# Patient Record
Sex: MALE | Race: OTHER | HISPANIC OR LATINO
[De-identification: names, ages, dates, MRNs, and addresses within clinical notes are randomized per-mention and may not be internally consistent; named-entity substitution may affect disease eponyms.]

---

## 2024-05-25 ENCOUNTER — RESULT REVIEW (OUTPATIENT)
Age: 29
End: 2024-05-25

## 2024-05-25 ENCOUNTER — INPATIENT (INPATIENT)
Facility: HOSPITAL | Age: 29
LOS: 0 days | Discharge: HOME | End: 2024-05-26
Attending: INTERNAL MEDICINE | Admitting: INTERNAL MEDICINE
Payer: MEDICAID

## 2024-05-25 VITALS
RESPIRATION RATE: 16 BRPM | SYSTOLIC BLOOD PRESSURE: 124 MMHG | WEIGHT: 156.09 LBS | TEMPERATURE: 98 F | DIASTOLIC BLOOD PRESSURE: 85 MMHG | HEART RATE: 68 BPM | OXYGEN SATURATION: 100 %

## 2024-05-25 DIAGNOSIS — K81.0 ACUTE CHOLECYSTITIS: ICD-10-CM

## 2024-05-25 LAB
ALBUMIN SERPL ELPH-MCNC: 4.2 G/DL — SIGNIFICANT CHANGE UP (ref 3.3–5)
ALP SERPL-CCNC: 110 U/L — SIGNIFICANT CHANGE UP (ref 40–120)
ALT FLD-CCNC: 26 U/L — SIGNIFICANT CHANGE UP (ref 4–41)
ANION GAP SERPL CALC-SCNC: 12 MMOL/L — SIGNIFICANT CHANGE UP (ref 7–14)
APTT BLD: 33.7 SEC — SIGNIFICANT CHANGE UP (ref 24.5–35.6)
AST SERPL-CCNC: 19 U/L — SIGNIFICANT CHANGE UP (ref 4–40)
BASOPHILS # BLD AUTO: 0.03 K/UL — SIGNIFICANT CHANGE UP (ref 0–0.2)
BASOPHILS NFR BLD AUTO: 0.4 % — SIGNIFICANT CHANGE UP (ref 0–2)
BILIRUB SERPL-MCNC: 0.9 MG/DL — SIGNIFICANT CHANGE UP (ref 0.2–1.2)
BLD GP AB SCN SERPL QL: NEGATIVE — SIGNIFICANT CHANGE UP
BUN SERPL-MCNC: 14 MG/DL — SIGNIFICANT CHANGE UP (ref 7–23)
CALCIUM SERPL-MCNC: 9.2 MG/DL — SIGNIFICANT CHANGE UP (ref 8.4–10.5)
CHLORIDE SERPL-SCNC: 103 MMOL/L — SIGNIFICANT CHANGE UP (ref 98–107)
CO2 SERPL-SCNC: 23 MMOL/L — SIGNIFICANT CHANGE UP (ref 22–31)
CREAT SERPL-MCNC: 0.87 MG/DL — SIGNIFICANT CHANGE UP (ref 0.5–1.3)
EGFR: 119 ML/MIN/1.73M2 — SIGNIFICANT CHANGE UP
EOSINOPHIL # BLD AUTO: 0.19 K/UL — SIGNIFICANT CHANGE UP (ref 0–0.5)
EOSINOPHIL NFR BLD AUTO: 2.6 % — SIGNIFICANT CHANGE UP (ref 0–6)
GLUCOSE SERPL-MCNC: 85 MG/DL — SIGNIFICANT CHANGE UP (ref 70–99)
HCT VFR BLD CALC: 43.2 % — SIGNIFICANT CHANGE UP (ref 39–50)
HGB BLD-MCNC: 15.1 G/DL — SIGNIFICANT CHANGE UP (ref 13–17)
IANC: 4.35 K/UL — SIGNIFICANT CHANGE UP (ref 1.8–7.4)
IMM GRANULOCYTES NFR BLD AUTO: 0.1 % — SIGNIFICANT CHANGE UP (ref 0–0.9)
INR BLD: 1.05 RATIO — SIGNIFICANT CHANGE UP (ref 0.85–1.18)
LIDOCAIN IGE QN: 40 U/L — SIGNIFICANT CHANGE UP (ref 7–60)
LYMPHOCYTES # BLD AUTO: 1.96 K/UL — SIGNIFICANT CHANGE UP (ref 1–3.3)
LYMPHOCYTES # BLD AUTO: 27 % — SIGNIFICANT CHANGE UP (ref 13–44)
MCHC RBC-ENTMCNC: 29.1 PG — SIGNIFICANT CHANGE UP (ref 27–34)
MCHC RBC-ENTMCNC: 35 GM/DL — SIGNIFICANT CHANGE UP (ref 32–36)
MCV RBC AUTO: 83.2 FL — SIGNIFICANT CHANGE UP (ref 80–100)
MONOCYTES # BLD AUTO: 0.71 K/UL — SIGNIFICANT CHANGE UP (ref 0–0.9)
MONOCYTES NFR BLD AUTO: 9.8 % — SIGNIFICANT CHANGE UP (ref 2–14)
NEUTROPHILS # BLD AUTO: 4.35 K/UL — SIGNIFICANT CHANGE UP (ref 1.8–7.4)
NEUTROPHILS NFR BLD AUTO: 60.1 % — SIGNIFICANT CHANGE UP (ref 43–77)
NRBC # BLD: 0 /100 WBCS — SIGNIFICANT CHANGE UP (ref 0–0)
NRBC # FLD: 0 K/UL — SIGNIFICANT CHANGE UP (ref 0–0)
PLATELET # BLD AUTO: 239 K/UL — SIGNIFICANT CHANGE UP (ref 150–400)
POTASSIUM SERPL-MCNC: 4.5 MMOL/L — SIGNIFICANT CHANGE UP (ref 3.5–5.3)
POTASSIUM SERPL-SCNC: 4.5 MMOL/L — SIGNIFICANT CHANGE UP (ref 3.5–5.3)
PROT SERPL-MCNC: 7.4 G/DL — SIGNIFICANT CHANGE UP (ref 6–8.3)
PROTHROM AB SERPL-ACNC: 11.7 SEC — SIGNIFICANT CHANGE UP (ref 9.5–13)
RBC # BLD: 5.19 M/UL — SIGNIFICANT CHANGE UP (ref 4.2–5.8)
RBC # FLD: 12.8 % — SIGNIFICANT CHANGE UP (ref 10.3–14.5)
RH IG SCN BLD-IMP: POSITIVE — SIGNIFICANT CHANGE UP
SODIUM SERPL-SCNC: 138 MMOL/L — SIGNIFICANT CHANGE UP (ref 135–145)
WBC # BLD: 7.25 K/UL — SIGNIFICANT CHANGE UP (ref 3.8–10.5)
WBC # FLD AUTO: 7.25 K/UL — SIGNIFICANT CHANGE UP (ref 3.8–10.5)

## 2024-05-25 PROCEDURE — 47562 LAPAROSCOPIC CHOLECYSTECTOMY: CPT | Mod: 80

## 2024-05-25 PROCEDURE — 76705 ECHO EXAM OF ABDOMEN: CPT | Mod: 26

## 2024-05-25 PROCEDURE — 88304 TISSUE EXAM BY PATHOLOGIST: CPT | Mod: 26

## 2024-05-25 PROCEDURE — 99285 EMERGENCY DEPT VISIT HI MDM: CPT

## 2024-05-25 PROCEDURE — 99222 1ST HOSP IP/OBS MODERATE 55: CPT | Mod: GC,57

## 2024-05-25 DEVICE — LIGATING CLIPS WECK HEMOLOK POLYMER LARGE (PURPLE) 6: Type: IMPLANTABLE DEVICE | Status: FUNCTIONAL

## 2024-05-25 RX ORDER — PIPERACILLIN AND TAZOBACTAM 4; .5 G/20ML; G/20ML
3.38 INJECTION, POWDER, LYOPHILIZED, FOR SOLUTION INTRAVENOUS ONCE
Refills: 0 | Status: COMPLETED | OUTPATIENT
Start: 2024-05-25 | End: 2024-05-25

## 2024-05-25 RX ORDER — OXYCODONE HYDROCHLORIDE 5 MG/1
5 TABLET ORAL EVERY 4 HOURS
Refills: 0 | Status: DISCONTINUED | OUTPATIENT
Start: 2024-05-25 | End: 2024-05-26

## 2024-05-25 RX ORDER — LIDOCAINE 4 G/100G
10 CREAM TOPICAL ONCE
Refills: 0 | Status: COMPLETED | OUTPATIENT
Start: 2024-05-25 | End: 2024-05-25

## 2024-05-25 RX ORDER — ENOXAPARIN SODIUM 100 MG/ML
40 INJECTION SUBCUTANEOUS EVERY 24 HOURS
Refills: 0 | Status: DISCONTINUED | OUTPATIENT
Start: 2024-05-25 | End: 2024-05-26

## 2024-05-25 RX ORDER — PIPERACILLIN AND TAZOBACTAM 4; .5 G/20ML; G/20ML
3.38 INJECTION, POWDER, LYOPHILIZED, FOR SOLUTION INTRAVENOUS EVERY 8 HOURS
Refills: 0 | Status: DISCONTINUED | OUTPATIENT
Start: 2024-05-26 | End: 2024-05-26

## 2024-05-25 RX ORDER — SODIUM CHLORIDE 9 MG/ML
1000 INJECTION, SOLUTION INTRAVENOUS
Refills: 0 | Status: DISCONTINUED | OUTPATIENT
Start: 2024-05-25 | End: 2024-05-26

## 2024-05-25 RX ORDER — ACETAMINOPHEN 500 MG
975 TABLET ORAL EVERY 6 HOURS
Refills: 0 | Status: DISCONTINUED | OUTPATIENT
Start: 2024-05-25 | End: 2024-05-26

## 2024-05-25 RX ORDER — SODIUM CHLORIDE 9 MG/ML
1000 INJECTION, SOLUTION INTRAVENOUS ONCE
Refills: 0 | Status: COMPLETED | OUTPATIENT
Start: 2024-05-25 | End: 2024-05-25

## 2024-05-25 RX ORDER — PIPERACILLIN AND TAZOBACTAM 4; .5 G/20ML; G/20ML
3.38 INJECTION, POWDER, LYOPHILIZED, FOR SOLUTION INTRAVENOUS ONCE
Refills: 0 | Status: COMPLETED | OUTPATIENT
Start: 2024-05-26 | End: 2024-05-26

## 2024-05-25 RX ORDER — FAMOTIDINE 10 MG/ML
20 INJECTION INTRAVENOUS ONCE
Refills: 0 | Status: COMPLETED | OUTPATIENT
Start: 2024-05-25 | End: 2024-05-25

## 2024-05-25 RX ADMIN — SODIUM CHLORIDE 1000 MILLILITER(S): 9 INJECTION, SOLUTION INTRAVENOUS at 07:59

## 2024-05-25 RX ADMIN — PIPERACILLIN AND TAZOBACTAM 25 GRAM(S): 4; .5 INJECTION, POWDER, LYOPHILIZED, FOR SOLUTION INTRAVENOUS at 18:49

## 2024-05-25 RX ADMIN — LIDOCAINE 10 MILLILITER(S): 4 CREAM TOPICAL at 03:38

## 2024-05-25 RX ADMIN — PIPERACILLIN AND TAZOBACTAM 25 GRAM(S): 4; .5 INJECTION, POWDER, LYOPHILIZED, FOR SOLUTION INTRAVENOUS at 12:09

## 2024-05-25 RX ADMIN — PIPERACILLIN AND TAZOBACTAM 200 GRAM(S): 4; .5 INJECTION, POWDER, LYOPHILIZED, FOR SOLUTION INTRAVENOUS at 07:19

## 2024-05-25 RX ADMIN — Medication 30 MILLILITER(S): at 03:37

## 2024-05-25 RX ADMIN — ENOXAPARIN SODIUM 40 MILLIGRAM(S): 100 INJECTION SUBCUTANEOUS at 12:09

## 2024-05-25 RX ADMIN — FAMOTIDINE 20 MILLIGRAM(S): 10 INJECTION INTRAVENOUS at 03:38

## 2024-05-25 NOTE — PATIENT PROFILE ADULT - IS THERE A SUSPICION OF ABUSE/NEGLIGENCE?
Prior to initial hospitalization pt lived with his spouse and children in a ph, 12 fadi +HR, additional 5 steps to bedroom +hand rail  +tub in bathroom +hand rail. Pt reports that he was independent in all adl's and functional mobility without DME.  Prior to transfer here from OSH pt was ambulating with a rolling walker no

## 2024-05-25 NOTE — H&P ADULT - ASSESSMENT
28M with no PMHx/PSHx presents with acute onset epigastric pain radiating to back after eating dinner. Multiple episodes of NBNB vomiting. RUQ TTP. WBC 7. LFTs, tbili WNL. RUQ US showing cholelithiasis. Pt is Druze and refuses blood transfusion.    PLAN  - Acute cholecystitis based on cholelithiasis on imaging and RUQ TTP  - OR add-on for lap desiree #9 - pt aware surgery not go today  - Consent in chart - Jordanian  - Abx: zosyn  - Pain: ATC IV tylenol  - Diet: NPO, IVF  - DVT PPx: lovenox    Discussed with Dr. Beltrán    Surgery Team B  [id number]

## 2024-05-25 NOTE — ED PROVIDER NOTE - PHYSICAL EXAMINATION
VITALS: reviewed  GEN: NAD, A & O x 4  HEAD/EYES: NCAT, EOMI, anicteric sclerae,   ENT: mucus membranes moist, oropharynx WNL, trachea midline,  RESP: lungs CTA with equal breath sounds bilaterally, chest wall nontender and atraumatic  CV: heart with reg rhythm S1, S2, distal pulses intact and symmetric bilaterally  ABDOMEN: soft, nondistended, epigastric ttp, no palpable masses  : no CVAT  MSK: extremities atraumatic and nontender, no edema, no asymmetry.  SKIN: warm, dry, no rash, no bruising, no cyanosis. color appropriate for ethnicity  NEURO: alert, mentating appropriately, no facial asymmetry.   PSYCH: Affect appropriate

## 2024-05-25 NOTE — ED ADULT NURSE NOTE - OBJECTIVE STATEMENT
28 year old AO4 ambulatory male c/o epigastric pain after eating dinner. Denies PMHx. Noted to be in no acute distress. Right 20g IV placed, labs drawn, medicated as per eMAR. Pending imaging.

## 2024-05-25 NOTE — ED ADULT TRIAGE NOTE - HEART RATE (BEATS/MIN)
Patient called stating the pharmacy did not recieve our request for the methylphenidate (RITALIN) 20 MG tablet.    Please call to discuss.    68

## 2024-05-25 NOTE — H&P ADULT - NSHPLABSRESULTS_GEN_ALL_CORE
Vital Signs Last 24 Hrs  T(C): 36.6 (25 May 2024 00:16), Max: 36.6 (25 May 2024 00:16)  T(F): 97.9 (25 May 2024 00:16), Max: 97.9 (25 May 2024 00:16)  HR: 68 (25 May 2024 00:16) (68 - 68)  BP: 124/85 (25 May 2024 00:16) (124/85 - 124/85)  BP(mean): --  RR: 16 (25 May 2024 00:16) (16 - 16)  SpO2: 100% (25 May 2024 00:16) (100% - 100%)    LABS:                        15.1   7.25  )-----------( 239      ( 25 May 2024 04:16 )             43.2     05-25    138  |  103  |  14  ----------------------------<  85  4.5   |  23  |  0.87    Ca    9.2      25 May 2024 04:16    TPro  7.4  /  Alb  4.2  /  TBili  0.9  /  DBili  x   /  AST  19  /  ALT  26  /  AlkPhos  110  05-25    PT/INR - ( 25 May 2024 06:05 )   PT: 11.7 sec;   INR: 1.05 ratio         PTT - ( 25 May 2024 06:05 )  PTT:33.7 sec  Urinalysis Basic - ( 25 May 2024 04:16 )    Color: x / Appearance: x / SG: x / pH: x  Gluc: 85 mg/dL / Ketone: x  / Bili: x / Urobili: x   Blood: x / Protein: x / Nitrite: x   Leuk Esterase: x / RBC: x / WBC x   Sq Epi: x / Non Sq Epi: x / Bacteria: x    LIVER FUNCTIONS - ( 25 May 2024 04:16 )  Alb: 4.2 g/dL / Pro: 7.4 g/dL / ALK PHOS: 110 U/L / ALT: 26 U/L / AST: 19 U/L / GGT: x           RADIOLOGY & ADDITIONAL STUDIES:  < from: US Abdomen Upper Quadrant Right (05.25.24 @ 05:36) >    ACC: [id number] EXAM:  US ABDOMEN RT UPR QUADRANT   ORDERED BY: MISBAH FRANCO     PROCEDURE DATE:  05/25/2024          INTERPRETATION:  CLINICAL INFORMATION: Right upper quadrant abdominal   pain.    COMPARISON: None available.    TECHNIQUE: Sonographyof the right upper quadrant.    FINDINGS:    Liver: Within normal limits.  Bile ducts: Normal caliber. Common bile duct measures 3 mm.  Gallbladder: Multiple gallstones extending into the neck identified,   largest measuring up to 1.2 cm. No significant gallbladder wall   thickening or pericholecystic edema. Negative reported sonographic Nguyen   sign.  Pancreas: Visualized portions are within normal limits.  Right kidney: 8.9 cm. No hydronephrosis.  Ascites: None.  Aorta/IVC: Suboptimally assessed secondary to overlying bowel gas.    IMPRESSION:    Cholelithiasis. No significant gallbladder wall thickening or   pericholecystic edema.  Consider right upper quadrant ultrasound if there is concern for acute   gallbladder disease.    < end of copied text >

## 2024-05-25 NOTE — H&P ADULT - HISTORY OF PRESENT ILLNESS
28M with no PMHx/PSHx presents with acute onset epigastric pain radiating to back after eating dinner. Multiple episodes of NBNB vomiting. Pt denies fever, chills, CP, SOB, distension, constipation, dysuria.

## 2024-05-25 NOTE — H&P ADULT - NSHPPHYSICALEXAM_GEN_ALL_CORE
Physical Exam:  General: NAD, resting comfortably  HEENT: NC/AT, EOMI, normal hearing, no oral lesions, no LAD, neck supple  Pulmonary: normal resp effort, patent airway  Abdominal: soft, nondistended, epigastric/RUQ TTP  Extremities: WWP, normal strength  Neuro: A/O x 3, CNs II-XII grossly intact

## 2024-05-25 NOTE — ED ADULT NURSE NOTE - CHIEF COMPLAINT QUOTE
pt c/o epigastric pain after eating dinner tonight. states area tender to touch. no past medical hx. pt denies chest pain, vomiting. pt well appearing. awaiting ekg.

## 2024-05-25 NOTE — H&P ADULT - ATTENDING COMMENTS
Patient with acute cholecystitis based on clinical exam, labs, and imaging  plan  or for lap desiree  iv abx    I have personally interviewed and examined this patient, reviewed pertinent labs and imaging, and discussed the case with colleagues, residents, and physician assistants on B Team rounds.    The active care issues are:  1. acute cholecystitis    The Acute Care Surgery (B Team) Attending Group Practice:  Dr. Aidan Hutton, Dr. Forrest Barajas, Dr. Charlie Beltrán, Dr. Rafat Serrano,     urgent issues - spectra [id number]  nonurgent issues - [phone]  patient appointments or afterhours - [phone]

## 2024-05-25 NOTE — ED PROVIDER NOTE - CLINICAL SUMMARY MEDICAL DECISION MAKING FREE TEXT BOX
27 yo M, no reported pmhx, presenting with complaints of post-prandial epigastric abdominal pain, radiating to his back. Pain started after eating dinner tonight at  7 pm. Associated nausea, no vomiting. Pain is described as strong, burning in sensation, radiating to back and up his throat. No exertional symptoms. No recent travel. No leg pain/swelling. Non-positional. Denies history of abdominal surgeries. Pt notes that pain is worse when he touches the area. On exam, + epigastric ttp. Plan for labs, including lipase, US and reassess. ddx: pancreatitis, cholecystitis/biliary colic, gastritis, vs other intra-abdominal pathology. Reassess s/p meds

## 2024-05-26 ENCOUNTER — TRANSCRIPTION ENCOUNTER (OUTPATIENT)
Age: 29
End: 2024-05-26

## 2024-05-26 VITALS
DIASTOLIC BLOOD PRESSURE: 67 MMHG | OXYGEN SATURATION: 97 % | TEMPERATURE: 98 F | RESPIRATION RATE: 18 BRPM | SYSTOLIC BLOOD PRESSURE: 107 MMHG | HEART RATE: 70 BPM

## 2024-05-26 LAB
ALBUMIN SERPL ELPH-MCNC: 3.9 G/DL — SIGNIFICANT CHANGE UP (ref 3.3–5)
ALBUMIN SERPL ELPH-MCNC: 4.2 G/DL — SIGNIFICANT CHANGE UP (ref 3.3–5)
ALP SERPL-CCNC: 102 U/L — SIGNIFICANT CHANGE UP (ref 40–120)
ALP SERPL-CCNC: 97 U/L — SIGNIFICANT CHANGE UP (ref 40–120)
ALT FLD-CCNC: 30 U/L — SIGNIFICANT CHANGE UP (ref 4–41)
ALT FLD-CCNC: 59 U/L — HIGH (ref 4–41)
ANION GAP SERPL CALC-SCNC: 11 MMOL/L — SIGNIFICANT CHANGE UP (ref 7–14)
ANION GAP SERPL CALC-SCNC: 15 MMOL/L — HIGH (ref 7–14)
AST SERPL-CCNC: 28 U/L — SIGNIFICANT CHANGE UP (ref 4–40)
AST SERPL-CCNC: 49 U/L — HIGH (ref 4–40)
BASOPHILS # BLD AUTO: 0.02 K/UL — SIGNIFICANT CHANGE UP (ref 0–0.2)
BASOPHILS NFR BLD AUTO: 0.2 % — SIGNIFICANT CHANGE UP (ref 0–2)
BILIRUB SERPL-MCNC: 1.5 MG/DL — HIGH (ref 0.2–1.2)
BILIRUB SERPL-MCNC: 1.7 MG/DL — HIGH (ref 0.2–1.2)
BUN SERPL-MCNC: 14 MG/DL — SIGNIFICANT CHANGE UP (ref 7–23)
BUN SERPL-MCNC: 14 MG/DL — SIGNIFICANT CHANGE UP (ref 7–23)
CALCIUM SERPL-MCNC: 9 MG/DL — SIGNIFICANT CHANGE UP (ref 8.4–10.5)
CALCIUM SERPL-MCNC: 9.1 MG/DL — SIGNIFICANT CHANGE UP (ref 8.4–10.5)
CHLORIDE SERPL-SCNC: 102 MMOL/L — SIGNIFICANT CHANGE UP (ref 98–107)
CHLORIDE SERPL-SCNC: 104 MMOL/L — SIGNIFICANT CHANGE UP (ref 98–107)
CO2 SERPL-SCNC: 21 MMOL/L — LOW (ref 22–31)
CO2 SERPL-SCNC: 22 MMOL/L — SIGNIFICANT CHANGE UP (ref 22–31)
CREAT SERPL-MCNC: 0.86 MG/DL — SIGNIFICANT CHANGE UP (ref 0.5–1.3)
CREAT SERPL-MCNC: 1.02 MG/DL — SIGNIFICANT CHANGE UP (ref 0.5–1.3)
EGFR: 101 ML/MIN/1.73M2 — SIGNIFICANT CHANGE UP
EGFR: 119 ML/MIN/1.73M2 — SIGNIFICANT CHANGE UP
EOSINOPHIL # BLD AUTO: 0.01 K/UL — SIGNIFICANT CHANGE UP (ref 0–0.5)
EOSINOPHIL NFR BLD AUTO: 0.1 % — SIGNIFICANT CHANGE UP (ref 0–6)
GLUCOSE SERPL-MCNC: 107 MG/DL — HIGH (ref 70–99)
GLUCOSE SERPL-MCNC: 157 MG/DL — HIGH (ref 70–99)
HCT VFR BLD CALC: 42.1 % — SIGNIFICANT CHANGE UP (ref 39–50)
HGB BLD-MCNC: 15.2 G/DL — SIGNIFICANT CHANGE UP (ref 13–17)
IANC: 10.51 K/UL — HIGH (ref 1.8–7.4)
IMM GRANULOCYTES NFR BLD AUTO: 0.3 % — SIGNIFICANT CHANGE UP (ref 0–0.9)
LYMPHOCYTES # BLD AUTO: 0.81 K/UL — LOW (ref 1–3.3)
LYMPHOCYTES # BLD AUTO: 7 % — LOW (ref 13–44)
MAGNESIUM SERPL-MCNC: 2 MG/DL — SIGNIFICANT CHANGE UP (ref 1.6–2.6)
MCHC RBC-ENTMCNC: 29.7 PG — SIGNIFICANT CHANGE UP (ref 27–34)
MCHC RBC-ENTMCNC: 36.1 GM/DL — HIGH (ref 32–36)
MCV RBC AUTO: 82.4 FL — SIGNIFICANT CHANGE UP (ref 80–100)
MONOCYTES # BLD AUTO: 0.12 K/UL — SIGNIFICANT CHANGE UP (ref 0–0.9)
MONOCYTES NFR BLD AUTO: 1 % — LOW (ref 2–14)
NEUTROPHILS # BLD AUTO: 10.51 K/UL — HIGH (ref 1.8–7.4)
NEUTROPHILS NFR BLD AUTO: 91.4 % — HIGH (ref 43–77)
NRBC # BLD: 0 /100 WBCS — SIGNIFICANT CHANGE UP (ref 0–0)
NRBC # FLD: 0 K/UL — SIGNIFICANT CHANGE UP (ref 0–0)
PHOSPHATE SERPL-MCNC: 3.5 MG/DL — SIGNIFICANT CHANGE UP (ref 2.5–4.5)
PLATELET # BLD AUTO: 230 K/UL — SIGNIFICANT CHANGE UP (ref 150–400)
POTASSIUM SERPL-MCNC: 4.3 MMOL/L — SIGNIFICANT CHANGE UP (ref 3.5–5.3)
POTASSIUM SERPL-MCNC: 4.3 MMOL/L — SIGNIFICANT CHANGE UP (ref 3.5–5.3)
POTASSIUM SERPL-SCNC: 4.3 MMOL/L — SIGNIFICANT CHANGE UP (ref 3.5–5.3)
POTASSIUM SERPL-SCNC: 4.3 MMOL/L — SIGNIFICANT CHANGE UP (ref 3.5–5.3)
PROT SERPL-MCNC: 6.9 G/DL — SIGNIFICANT CHANGE UP (ref 6–8.3)
PROT SERPL-MCNC: 7.3 G/DL — SIGNIFICANT CHANGE UP (ref 6–8.3)
RBC # BLD: 5.11 M/UL — SIGNIFICANT CHANGE UP (ref 4.2–5.8)
RBC # FLD: 13.1 % — SIGNIFICANT CHANGE UP (ref 10.3–14.5)
SODIUM SERPL-SCNC: 137 MMOL/L — SIGNIFICANT CHANGE UP (ref 135–145)
SODIUM SERPL-SCNC: 138 MMOL/L — SIGNIFICANT CHANGE UP (ref 135–145)
WBC # BLD: 11.51 K/UL — HIGH (ref 3.8–10.5)
WBC # FLD AUTO: 11.51 K/UL — HIGH (ref 3.8–10.5)

## 2024-05-26 RX ORDER — OXYCODONE HYDROCHLORIDE 5 MG/1
5 TABLET ORAL EVERY 4 HOURS
Refills: 0 | Status: DISCONTINUED | OUTPATIENT
Start: 2024-05-26 | End: 2024-05-26

## 2024-05-26 RX ORDER — OXYCODONE HYDROCHLORIDE 5 MG/1
2.5 TABLET ORAL EVERY 4 HOURS
Refills: 0 | Status: DISCONTINUED | OUTPATIENT
Start: 2024-05-26 | End: 2024-05-26

## 2024-05-26 RX ORDER — HYDROMORPHONE HYDROCHLORIDE 2 MG/ML
1 INJECTION INTRAMUSCULAR; INTRAVENOUS; SUBCUTANEOUS
Refills: 0 | Status: DISCONTINUED | OUTPATIENT
Start: 2024-05-26 | End: 2024-05-26

## 2024-05-26 RX ORDER — HYDROMORPHONE HYDROCHLORIDE 2 MG/ML
0.5 INJECTION INTRAMUSCULAR; INTRAVENOUS; SUBCUTANEOUS
Refills: 0 | Status: DISCONTINUED | OUTPATIENT
Start: 2024-05-26 | End: 2024-05-26

## 2024-05-26 RX ORDER — ONDANSETRON 8 MG/1
4 TABLET, FILM COATED ORAL ONCE
Refills: 0 | Status: DISCONTINUED | OUTPATIENT
Start: 2024-05-26 | End: 2024-05-26

## 2024-05-26 RX ORDER — OXYCODONE HYDROCHLORIDE 5 MG/1
1 TABLET ORAL
Qty: 6 | Refills: 0
Start: 2024-05-26

## 2024-05-26 RX ADMIN — PIPERACILLIN AND TAZOBACTAM 25 GRAM(S): 4; .5 INJECTION, POWDER, LYOPHILIZED, FOR SOLUTION INTRAVENOUS at 02:03

## 2024-05-26 RX ADMIN — OXYCODONE HYDROCHLORIDE 5 MILLIGRAM(S): 5 TABLET ORAL at 10:57

## 2024-05-26 RX ADMIN — OXYCODONE HYDROCHLORIDE 5 MILLIGRAM(S): 5 TABLET ORAL at 02:03

## 2024-05-26 NOTE — DISCHARGE NOTE NURSING/CASE MANAGEMENT/SOCIAL WORK - NSDCPEFALRISK_GEN_ALL_CORE
For information on Fall & Injury Prevention, visit: [email or web address] OR  [email or web address] OR  https://www.cdc.gov/steadi/patient.html

## 2024-05-26 NOTE — DISCHARGE NOTE PROVIDER - NSDCCPTREATMENT_GEN_ALL_CORE_FT
PRINCIPAL PROCEDURE  Procedure: Robot-assisted cholecystectomy  Findings and Treatment: WOUND CARE: Your incisions are covered with steri strips or dermabond. This will fall off on its own. Do not remove.   PAIN: You may alternate tylenol and motrin every 3 hours. You may take the opioid sent to your pharmacy for severe pain.   BATHING: Please do not submerge wound underwater 3-4 weeks. You may shower and/or sponge bathe tomorrow. Do not rub the area. Pat dry.  ACTIVITY: No heavy lifting anything more than 10-15lbs or straining. Otherwise, you may return to your usual level of physical activity. If you are taking narcotic pain medication (such as Percocet), do NOT drive a car, operate machinery or make important decisions.  DIET: Regular. Recommend low fat diet.  NOTIFY YOUR SURGEON IF: You have any bleeding that does not stop, any pus draining from your wound, any fever (over 100.4 F) or chills, persistent nausea/vomiting with inability to tolerate food or liquids, persistent diarrhea, or if your pain is not controlled on your discharge pain medications.  FOLLOW-UP:  1. Please call to make a follow-up appointment within 1-2 weeks of discharge   2. Please follow up with your primary care physician in one week regarding your hospitalization.

## 2024-05-26 NOTE — DISCHARGE NOTE PROVIDER - CARE PROVIDER_API CALL
Forrest Barajas  Surgery  [id number] [place] [id number]  Phone: [phone]  Fax: [phone]  Follow Up Time: 2 weeks

## 2024-05-26 NOTE — PROGRESS NOTE ADULT - ASSESSMENT
28M with no PMHx/PSHx presents with acute onset epigastric pain radiating to back after eating dinner. Multiple episodes of NBNB vomiting. RUQ TTP. WBC 7. LFTs, tbili WNL. RUQ US showing cholelithiasis. Pt is Christianity and refuses blood transfusion. Now s/p 5/26 robotic cholecystectomy and recovering well.    Plan:  - reg diet  - pain control  - dvt ppx  - oob as tolerating  - f/u 12 PM CMP  - dispo: home if bilirubin downtrending    B Team  [id number]

## 2024-05-26 NOTE — DISCHARGE NOTE NURSING/CASE MANAGEMENT/SOCIAL WORK - PATIENT PORTAL LINK FT
You can access the FollowMyHealth Patient Portal offered by [facility] by registering at the following website: [email or web address]. By joining [facility]’s FollowMyHealth portal, you will also be able to view your health information using other applications (apps) compatible with our system.

## 2024-05-26 NOTE — DISCHARGE NOTE PROVIDER - HOSPITAL COURSE
28M with no PMHx/PSHx presents with acute onset epigastric pain radiating to back after eating dinner. Multiple episodes of NBNB vomiting. RUQ TTP. WBC 7. LFTs, tbili WNL. RUQ US showing cholelithiasis. Pt is Scientologist and refuses blood transfusion.    Patient admitted for acute cholecystitis based on cholelithiasis on imaging and RUQ TTP.     Added onto the OR schedule.     Patient taken to OR for Robot-assisted cholecystectomy. Operation was uncomplicated.  Post operatively, patient recovered in PACU.  Returned to floor where diet was resumed and pain was controlled with PO pain regimen.     On day of discharge, the patient was tolerating diet, ambulating well and pain controlled.

## 2024-05-26 NOTE — PROGRESS NOTE ADULT - SUBJECTIVE AND OBJECTIVE BOX
SUBJECTIVE: Patient seen and examined on AM rounds. Patient denies any complaints. States pain is well controlled. Tolerating reg without nausea or vomiting. + Flatus. - BM. Voiding since scott removed. Ambulating well. Denies fevers, chills, SOB, CP.      Vital Signs Last 24 Hrs  T(C): 36.8 (26 May 2024 09:44), Max: 37.1 (25 May 2024 21:26)  T(F): 98.2 (26 May 2024 09:44), Max: 98.8 (26 May 2024 06:10)  HR: 70 (26 May 2024 09:44) (56 - 85)  BP: 107/67 (26 May 2024 09:44) (99/65 - 135/76)  BP(mean): 83 (26 May 2024 01:45) (71 - 83)  RR: 18 (26 May 2024 09:44) (12 - 18)  SpO2: 97% (26 May 2024 09:44) (93% - 100%)    Parameters below as of 26 May 2024 09:44  Patient On (Oxygen Delivery Method): room air        I&O's Detail    25 May 2024 07:01  -  26 May 2024 07:00  --------------------------------------------------------  IN:  Total IN: 0 mL    OUT:    Voided (mL): 1550 mL  Total OUT: 1550 mL    Total NET: -1550 mL          Physical Exam:  General Appearance: Appears well, NAD  Respiratory: No acute resp distress  CV: RRR  Abdomen: Soft, nontender, nondistended, incisions clean and dry and intact  Extremities: Grossly symmetric      LABS:                        15.2   11.51 )-----------( 230      ( 26 May 2024 01:37 )             42.1     05-26    137  |  104  |  14  ----------------------------<  107<H>  4.3   |  22  |  1.02    Ca    9.1      26 May 2024 01:37    TPro  6.9  /  Alb  3.9  /  TBili  1.7<H>  /  DBili  x   /  AST  28  /  ALT  30  /  AlkPhos  97  05-26    PT/INR - ( 25 May 2024 06:05 )   PT: 11.7 sec;   INR: 1.05 ratio         PTT - ( 25 May 2024 06:05 )  PTT:33.7 sec  Urinalysis Basic - ( 26 May 2024 01:37 )    Color: x / Appearance: x / SG: x / pH: x  Gluc: 107 mg/dL / Ketone: x  / Bili: x / Urobili: x   Blood: x / Protein: x / Nitrite: x   Leuk Esterase: x / RBC: x / WBC x   Sq Epi: x / Non Sq Epi: x / Bacteria: x        RADIOLOGY & ADDITIONAL STUDIES:

## 2024-05-26 NOTE — CHART NOTE - NSCHARTNOTEFT_GEN_A_CORE
Post Operative Note  Patient: MACRINA MAY 28y (1995) Male   MRN: [id number]  Location: [place] [address] A  Visit: 05-25-24 Inpatient  Patient Seen and Examined: 05-26-24 @ 04:43    Procedure: S/P robot assisted cholecystectomy    Subjective: Patient seen and examined post operatively. Reports pain as controlled. Denies nausea, vomiting, fever, chills.      Objective:  Vitals: T(F): 98.5 (05-26-24 @ 02:45), Max: 98.7 (05-25-24 @ 21:26)  HR: 66 (05-26-24 @ 02:45)  BP: 112/62 (05-26-24 @ 02:45) (99/65 - 135/76)  RR: 14 (05-26-24 @ 02:45)  SpO2: 98% (05-26-24 @ 02:45)    Physical Examination:  General: NAD, sleeping comfortably in bed  HEENT: Normocephalic atraumatic  Respiratory: Nonlabored respirations, normal CW expansion, on RA  Cardio: S1S2, regular rate and rhythm  Abdomen: softly distended, appropriately tender, surgical incisions x4 are c/d/i  Vascular: extremities are warm and well perfused.     Imaging:  No post-op imaging studies    Assessment:  28y Male patient S/P robotic assisted cholecystectomy. Patient doing well on the floor, vital signs stable    Plan:  - Pain control PRN  - Diet: Regular  - IVF  - English removed at the end of the case, due to void at 7:30AM  - Activity: OOBAT  - DVT ppx: SCD's & lovenox  - Dispo: home tomorrow if passed ToV and tolerating diet      B Team Surgery  [id number]

## 2024-05-26 NOTE — BRIEF OPERATIVE NOTE - OPERATION/FINDINGS
Robotic assisted laparoscopic cholecystectomy. Critical view obtained prior to clipping of cystic duct and artery.

## 2024-06-05 PROBLEM — Z78.9 OTHER SPECIFIED HEALTH STATUS: Chronic | Status: ACTIVE | Noted: 2024-05-25

## 2024-06-07 PROBLEM — Z00.00 ENCOUNTER FOR PREVENTIVE HEALTH EXAMINATION: Status: ACTIVE | Noted: 2024-06-07

## 2024-06-07 LAB — SURGICAL PATHOLOGY STUDY: SIGNIFICANT CHANGE UP

## 2024-06-11 ENCOUNTER — APPOINTMENT (OUTPATIENT)
Dept: SURGERY | Facility: HOSPITAL | Age: 29
End: 2024-06-11

## 2024-06-11 ENCOUNTER — APPOINTMENT (OUTPATIENT)
Dept: TRAUMA SURGERY | Facility: HOSPITAL | Age: 29
End: 2024-06-11

## 2024-06-11 VITALS
SYSTOLIC BLOOD PRESSURE: 137 MMHG | DIASTOLIC BLOOD PRESSURE: 76 MMHG | BODY MASS INDEX: 30.39 KG/M2 | TEMPERATURE: 98.7 F | HEIGHT: 64 IN | WEIGHT: 178 LBS | HEART RATE: 71 BPM

## 2024-07-16 NOTE — ED ADULT TRIAGE NOTE - GLASGOW COMA SCALE: BEST VERBAL RESPONSE, MLM
Gerri Zelaya RN/Staff Member:  PEYMAN Castle           (V5) oriented

## 2024-11-09 ENCOUNTER — EMERGENCY (EMERGENCY)
Facility: HOSPITAL | Age: 29
LOS: 1 days | Discharge: HOME | End: 2024-11-09
Attending: EMERGENCY MEDICINE | Admitting: EMERGENCY MEDICINE
Payer: MEDICAID

## 2024-11-09 VITALS
HEIGHT: 64 IN | SYSTOLIC BLOOD PRESSURE: 172 MMHG | HEART RATE: 67 BPM | RESPIRATION RATE: 22 BRPM | DIASTOLIC BLOOD PRESSURE: 122 MMHG | WEIGHT: 184.09 LBS | OXYGEN SATURATION: 100 % | TEMPERATURE: 98 F

## 2024-11-09 PROCEDURE — 74177 CT ABD & PELVIS W/CONTRAST: CPT | Mod: 26,MC

## 2024-11-09 RX ORDER — KETOROLAC TROMETHAMINE 30 MG/ML
15 INJECTION INTRAMUSCULAR; INTRAVENOUS ONCE
Refills: 0 | Status: DISCONTINUED | OUTPATIENT
Start: 2024-11-09 | End: 2024-11-09

## 2024-11-09 RX ORDER — MORPHINE SULFATE 30 MG/1
4 TABLET, EXTENDED RELEASE ORAL ONCE
Refills: 0 | Status: DISCONTINUED | OUTPATIENT
Start: 2024-11-09 | End: 2024-11-09

## 2024-11-09 RX ORDER — HYDROMORPHONE HCL/0.9% NACL/PF 6 MG/30 ML
0.5 PATIENT CONTROLLED ANALGESIA SYRINGE INTRAVENOUS ONCE
Refills: 0 | Status: DISCONTINUED | OUTPATIENT
Start: 2024-11-09 | End: 2024-11-09

## 2024-11-09 RX ORDER — SODIUM CHLORIDE 9 MG/ML
1000 INJECTION, SOLUTION INTRAMUSCULAR; INTRAVENOUS; SUBCUTANEOUS ONCE
Refills: 0 | Status: COMPLETED | OUTPATIENT
Start: 2024-11-09 | End: 2024-11-09

## 2024-11-09 RX ORDER — ACETAMINOPHEN 500 MG
1000 TABLET ORAL ONCE
Refills: 0 | Status: COMPLETED | OUTPATIENT
Start: 2024-11-09 | End: 2024-11-09

## 2024-11-09 RX ADMIN — Medication 0.5 MILLIGRAM(S): at 23:49

## 2024-11-09 RX ADMIN — KETOROLAC TROMETHAMINE 15 MILLIGRAM(S): 30 INJECTION INTRAMUSCULAR; INTRAVENOUS at 23:49

## 2024-11-09 RX ADMIN — MORPHINE SULFATE 4 MILLIGRAM(S): 30 TABLET, EXTENDED RELEASE ORAL at 22:51

## 2024-11-09 RX ADMIN — SODIUM CHLORIDE 1000 MILLILITER(S): 9 INJECTION, SOLUTION INTRAMUSCULAR; INTRAVENOUS; SUBCUTANEOUS at 22:53

## 2024-11-09 RX ADMIN — Medication 400 MILLIGRAM(S): at 22:54

## 2024-11-09 NOTE — ED ADULT NURSE NOTE - OBJECTIVE STATEMENT
Pt received to room 26, A&Ox4, ambulatory. Pt Lithuanian speaking, translation services offered, pt denied. Pt's friend at bedside for translation. Pt presents to the ED today complaining of severe right sided flank pain radiating to pelvis and right leg. Pt had one episode of vomiting in the room. Pt appears very uncomfortable on assessment, unable to sit still, pt groaning in pain. Pt denies urinary symptoms, c/p, SOB, abd pain, headache, blurry vision, or fever like symptoms. NSR on cardiac monitor. Respirations even and unlabored. Pt arrives with 18G in the left AC from triage RN. Labs drawn and sent. Pt medicated as per MD orders. Comfort measures provided, safety maintained. Plan of care ongoing.

## 2024-11-09 NOTE — ED ADULT TRIAGE NOTE - PATIENT'S PREFERRED PRONOUN
Pt presents to triage with mother with complaints of fever 100.6 rectally at home and increased fussiness over the last two days. Mom denies any vomiitng. Mom reports pt is pulling at ears. Pt is afebrile in triage. Pt is very irritable during vital signs. Pt is consoled by feeding.   
Him/He

## 2024-11-10 VITALS
HEART RATE: 62 BPM | TEMPERATURE: 97 F | SYSTOLIC BLOOD PRESSURE: 124 MMHG | RESPIRATION RATE: 17 BRPM | DIASTOLIC BLOOD PRESSURE: 78 MMHG | OXYGEN SATURATION: 100 %

## 2024-11-10 LAB
ADD ON TEST-SPECIMEN IN LAB: SIGNIFICANT CHANGE UP
ALBUMIN SERPL ELPH-MCNC: 4 G/DL — SIGNIFICANT CHANGE UP (ref 3.3–5)
ALP SERPL-CCNC: 110 U/L — SIGNIFICANT CHANGE UP (ref 40–120)
ALT FLD-CCNC: 23 U/L — SIGNIFICANT CHANGE UP (ref 4–41)
ANION GAP SERPL CALC-SCNC: 10 MMOL/L — SIGNIFICANT CHANGE UP (ref 7–14)
ANION GAP SERPL CALC-SCNC: 16 MMOL/L — HIGH (ref 7–14)
APPEARANCE UR: CLEAR — SIGNIFICANT CHANGE UP
AST SERPL-CCNC: 21 U/L — SIGNIFICANT CHANGE UP (ref 4–40)
BACTERIA # UR AUTO: NEGATIVE /HPF — SIGNIFICANT CHANGE UP
BASOPHILS # BLD AUTO: 0.02 K/UL — SIGNIFICANT CHANGE UP (ref 0–0.2)
BASOPHILS # BLD AUTO: 0.03 K/UL — SIGNIFICANT CHANGE UP (ref 0–0.2)
BASOPHILS NFR BLD AUTO: 0.3 % — SIGNIFICANT CHANGE UP (ref 0–2)
BASOPHILS NFR BLD AUTO: 0.3 % — SIGNIFICANT CHANGE UP (ref 0–2)
BILIRUB SERPL-MCNC: 0.7 MG/DL — SIGNIFICANT CHANGE UP (ref 0.2–1.2)
BILIRUB UR-MCNC: NEGATIVE — SIGNIFICANT CHANGE UP
BLD GP AB SCN SERPL QL: NEGATIVE — SIGNIFICANT CHANGE UP
BLOOD GAS VENOUS COMPREHENSIVE RESULT: SIGNIFICANT CHANGE UP
BUN SERPL-MCNC: 17 MG/DL — SIGNIFICANT CHANGE UP (ref 7–23)
BUN SERPL-MCNC: 24 MG/DL — HIGH (ref 7–23)
CALCIUM SERPL-MCNC: 8.1 MG/DL — LOW (ref 8.4–10.5)
CALCIUM SERPL-MCNC: 8.8 MG/DL — SIGNIFICANT CHANGE UP (ref 8.4–10.5)
CAST: 0 /LPF — SIGNIFICANT CHANGE UP (ref 0–4)
CHLORIDE SERPL-SCNC: 105 MMOL/L — SIGNIFICANT CHANGE UP (ref 98–107)
CHLORIDE SERPL-SCNC: 109 MMOL/L — HIGH (ref 98–107)
CO2 SERPL-SCNC: 19 MMOL/L — LOW (ref 22–31)
CO2 SERPL-SCNC: 22 MMOL/L — SIGNIFICANT CHANGE UP (ref 22–31)
COLOR SPEC: YELLOW — SIGNIFICANT CHANGE UP
CREAT SERPL-MCNC: 0.83 MG/DL — SIGNIFICANT CHANGE UP (ref 0.5–1.3)
CREAT SERPL-MCNC: 0.98 MG/DL — SIGNIFICANT CHANGE UP (ref 0.5–1.3)
DIFF PNL FLD: ABNORMAL
EGFR: 106 ML/MIN/1.73M2 — SIGNIFICANT CHANGE UP
EGFR: 120 ML/MIN/1.73M2 — SIGNIFICANT CHANGE UP
EOSINOPHIL # BLD AUTO: 0.15 K/UL — SIGNIFICANT CHANGE UP (ref 0–0.5)
EOSINOPHIL # BLD AUTO: 0.24 K/UL — SIGNIFICANT CHANGE UP (ref 0–0.5)
EOSINOPHIL NFR BLD AUTO: 2.2 % — SIGNIFICANT CHANGE UP (ref 0–6)
EOSINOPHIL NFR BLD AUTO: 2.8 % — SIGNIFICANT CHANGE UP (ref 0–6)
GLUCOSE SERPL-MCNC: 112 MG/DL — HIGH (ref 70–99)
GLUCOSE SERPL-MCNC: 95 MG/DL — SIGNIFICANT CHANGE UP (ref 70–99)
GLUCOSE UR QL: NEGATIVE MG/DL — SIGNIFICANT CHANGE UP
HCT VFR BLD CALC: 39.5 % — SIGNIFICANT CHANGE UP (ref 39–50)
HCT VFR BLD CALC: 40.9 % — SIGNIFICANT CHANGE UP (ref 39–50)
HGB BLD-MCNC: 14.3 G/DL — SIGNIFICANT CHANGE UP (ref 13–17)
HGB BLD-MCNC: 14.4 G/DL — SIGNIFICANT CHANGE UP (ref 13–17)
IANC: 4.21 K/UL — SIGNIFICANT CHANGE UP (ref 1.8–7.4)
IANC: 5.36 K/UL — SIGNIFICANT CHANGE UP (ref 1.8–7.4)
IMM GRANULOCYTES NFR BLD AUTO: 0.1 % — SIGNIFICANT CHANGE UP (ref 0–0.9)
IMM GRANULOCYTES NFR BLD AUTO: 0.2 % — SIGNIFICANT CHANGE UP (ref 0–0.9)
KETONES UR-MCNC: NEGATIVE MG/DL — SIGNIFICANT CHANGE UP
LEUKOCYTE ESTERASE UR-ACNC: NEGATIVE — SIGNIFICANT CHANGE UP
LIDOCAIN IGE QN: 46 U/L — SIGNIFICANT CHANGE UP (ref 7–60)
LYMPHOCYTES # BLD AUTO: 1.84 K/UL — SIGNIFICANT CHANGE UP (ref 1–3.3)
LYMPHOCYTES # BLD AUTO: 2.21 K/UL — SIGNIFICANT CHANGE UP (ref 1–3.3)
LYMPHOCYTES # BLD AUTO: 25.7 % — SIGNIFICANT CHANGE UP (ref 13–44)
LYMPHOCYTES # BLD AUTO: 26.8 % — SIGNIFICANT CHANGE UP (ref 13–44)
MCHC RBC-ENTMCNC: 29.1 PG — SIGNIFICANT CHANGE UP (ref 27–34)
MCHC RBC-ENTMCNC: 29.8 PG — SIGNIFICANT CHANGE UP (ref 27–34)
MCHC RBC-ENTMCNC: 35 G/DL — SIGNIFICANT CHANGE UP (ref 32–36)
MCHC RBC-ENTMCNC: 36.5 G/DL — HIGH (ref 32–36)
MCV RBC AUTO: 81.6 FL — SIGNIFICANT CHANGE UP (ref 80–100)
MCV RBC AUTO: 83.1 FL — SIGNIFICANT CHANGE UP (ref 80–100)
MONOCYTES # BLD AUTO: 0.63 K/UL — SIGNIFICANT CHANGE UP (ref 0–0.9)
MONOCYTES # BLD AUTO: 0.74 K/UL — SIGNIFICANT CHANGE UP (ref 0–0.9)
MONOCYTES NFR BLD AUTO: 8.6 % — SIGNIFICANT CHANGE UP (ref 2–14)
MONOCYTES NFR BLD AUTO: 9.2 % — SIGNIFICANT CHANGE UP (ref 2–14)
NEUTROPHILS # BLD AUTO: 4.21 K/UL — SIGNIFICANT CHANGE UP (ref 1.8–7.4)
NEUTROPHILS # BLD AUTO: 5.36 K/UL — SIGNIFICANT CHANGE UP (ref 1.8–7.4)
NEUTROPHILS NFR BLD AUTO: 61.4 % — SIGNIFICANT CHANGE UP (ref 43–77)
NEUTROPHILS NFR BLD AUTO: 62.4 % — SIGNIFICANT CHANGE UP (ref 43–77)
NITRITE UR-MCNC: NEGATIVE — SIGNIFICANT CHANGE UP
NRBC # BLD: 0 /100 WBCS — SIGNIFICANT CHANGE UP (ref 0–0)
NRBC # BLD: 0 /100 WBCS — SIGNIFICANT CHANGE UP (ref 0–0)
NRBC # FLD: 0 K/UL — SIGNIFICANT CHANGE UP (ref 0–0)
NRBC # FLD: 0 K/UL — SIGNIFICANT CHANGE UP (ref 0–0)
PH UR: 6 — SIGNIFICANT CHANGE UP (ref 5–8)
PLATELET # BLD AUTO: 220 K/UL — SIGNIFICANT CHANGE UP (ref 150–400)
PLATELET # BLD AUTO: 241 K/UL — SIGNIFICANT CHANGE UP (ref 150–400)
POTASSIUM SERPL-MCNC: 3.5 MMOL/L — SIGNIFICANT CHANGE UP (ref 3.5–5.3)
POTASSIUM SERPL-MCNC: 3.9 MMOL/L — SIGNIFICANT CHANGE UP (ref 3.5–5.3)
POTASSIUM SERPL-SCNC: 3.5 MMOL/L — SIGNIFICANT CHANGE UP (ref 3.5–5.3)
POTASSIUM SERPL-SCNC: 3.9 MMOL/L — SIGNIFICANT CHANGE UP (ref 3.5–5.3)
PROT SERPL-MCNC: 6.8 G/DL — SIGNIFICANT CHANGE UP (ref 6–8.3)
PROT UR-MCNC: NEGATIVE MG/DL — SIGNIFICANT CHANGE UP
RBC # BLD: 4.84 M/UL — SIGNIFICANT CHANGE UP (ref 4.2–5.8)
RBC # BLD: 4.92 M/UL — SIGNIFICANT CHANGE UP (ref 4.2–5.8)
RBC # FLD: 12.2 % — SIGNIFICANT CHANGE UP (ref 10.3–14.5)
RBC # FLD: 12.7 % — SIGNIFICANT CHANGE UP (ref 10.3–14.5)
RBC CASTS # UR COMP ASSIST: 9 /HPF — HIGH (ref 0–4)
RH IG SCN BLD-IMP: POSITIVE — SIGNIFICANT CHANGE UP
SODIUM SERPL-SCNC: 140 MMOL/L — SIGNIFICANT CHANGE UP (ref 135–145)
SODIUM SERPL-SCNC: 141 MMOL/L — SIGNIFICANT CHANGE UP (ref 135–145)
SP GR SPEC: 1.05 — HIGH (ref 1–1.03)
SQUAMOUS # UR AUTO: 0 /HPF — SIGNIFICANT CHANGE UP (ref 0–5)
UROBILINOGEN FLD QL: 0.2 MG/DL — SIGNIFICANT CHANGE UP (ref 0.2–1)
WBC # BLD: 6.86 K/UL — SIGNIFICANT CHANGE UP (ref 3.8–10.5)
WBC # BLD: 8.6 K/UL — SIGNIFICANT CHANGE UP (ref 3.8–10.5)
WBC # FLD AUTO: 6.86 K/UL — SIGNIFICANT CHANGE UP (ref 3.8–10.5)
WBC # FLD AUTO: 8.6 K/UL — SIGNIFICANT CHANGE UP (ref 3.8–10.5)
WBC UR QL: 1 /HPF — SIGNIFICANT CHANGE UP (ref 0–5)

## 2024-11-10 PROCEDURE — 99236 HOSP IP/OBS SAME DATE HI 85: CPT

## 2024-11-10 RX ORDER — OXYCODONE AND ACETAMINOPHEN 7.5; 325 MG/1; MG/1
1 TABLET ORAL
Qty: 12 | Refills: 0
Start: 2024-11-10 | End: 2024-11-12

## 2024-11-10 RX ORDER — HYDROMORPHONE HCL/0.9% NACL/PF 6 MG/30 ML
1 PATIENT CONTROLLED ANALGESIA SYRINGE INTRAVENOUS ONCE
Refills: 0 | Status: DISCONTINUED | OUTPATIENT
Start: 2024-11-10 | End: 2024-11-10

## 2024-11-10 RX ORDER — SODIUM CHLORIDE 9 MG/ML
1000 INJECTION, SOLUTION INTRAMUSCULAR; INTRAVENOUS; SUBCUTANEOUS ONCE
Refills: 0 | Status: COMPLETED | OUTPATIENT
Start: 2024-11-10 | End: 2024-11-10

## 2024-11-10 RX ORDER — TAMSULOSIN HCL 0.4 MG
1 CAPSULE ORAL
Qty: 14 | Refills: 0
Start: 2024-11-10 | End: 2024-11-23

## 2024-11-10 RX ORDER — TAMSULOSIN HCL 0.4 MG
0.4 CAPSULE ORAL AT BEDTIME
Refills: 0 | Status: DISCONTINUED | OUTPATIENT
Start: 2024-11-10 | End: 2024-11-13

## 2024-11-10 RX ORDER — TAMSULOSIN HCL 0.4 MG
0.4 CAPSULE ORAL ONCE
Refills: 0 | Status: COMPLETED | OUTPATIENT
Start: 2024-11-10 | End: 2024-11-10

## 2024-11-10 RX ORDER — KETOROLAC TROMETHAMINE 30 MG/ML
30 INJECTION INTRAMUSCULAR; INTRAVENOUS EVERY 6 HOURS
Refills: 0 | Status: DISCONTINUED | OUTPATIENT
Start: 2024-11-10 | End: 2024-11-10

## 2024-11-10 RX ORDER — OXYCODONE AND ACETAMINOPHEN 7.5; 325 MG/1; MG/1
1 TABLET ORAL
Qty: 15 | Refills: 0
Start: 2024-11-10 | End: 2024-11-14

## 2024-11-10 RX ORDER — IBUPROFEN 200 MG
1 TABLET ORAL
Qty: 21 | Refills: 0
Start: 2024-11-10 | End: 2024-11-16

## 2024-11-10 RX ADMIN — KETOROLAC TROMETHAMINE 30 MILLIGRAM(S): 30 INJECTION INTRAMUSCULAR; INTRAVENOUS at 10:39

## 2024-11-10 RX ADMIN — Medication 0.4 MILLIGRAM(S): at 03:27

## 2024-11-10 RX ADMIN — Medication 150 MILLILITER(S): at 03:29

## 2024-11-10 RX ADMIN — KETOROLAC TROMETHAMINE 30 MILLIGRAM(S): 30 INJECTION INTRAMUSCULAR; INTRAVENOUS at 10:55

## 2024-11-10 RX ADMIN — Medication 150 MILLILITER(S): at 09:19

## 2024-11-10 RX ADMIN — SODIUM CHLORIDE 1000 MILLILITER(S): 9 INJECTION, SOLUTION INTRAMUSCULAR; INTRAVENOUS; SUBCUTANEOUS at 01:18

## 2024-11-10 NOTE — ED PROVIDER NOTE - ATTENDING CONTRIBUTION TO CARE
Patient in moderate distress, Not able to find a comfortable position, Abd soft no rebound, tender ruq and rlq and rcva.  likel renal colic, though GB or appendicitis is on the differential

## 2024-11-10 NOTE — ED CDU PROVIDER DISPOSITION NOTE - PATIENT PORTAL LINK FT
You can access the FollowMyHealth Patient Portal offered by [facility] by registering at the following website: [email or web address]. By joining [facility]’s FollowMyHealth portal, you will also be able to view your health information using other applications (apps) compatible with our system.

## 2024-11-10 NOTE — ED CDU PROVIDER INITIAL DAY NOTE - CLINICAL SUMMARY MEDICAL DECISION MAKING FREE TEXT BOX
28 yo male, no stated PMH, presented to the ED c/o acute onset right flank pain, radiating to groin.  In the ED, initial /122, VSS otherwise, pt afebrile in ED.  WBC 8.60; CMP: bicarb 19, anion gap 16, BUN 24, cr 0.98.  Lipase 46.  A1c 4.6.  VBG: pH 7.42, lactate 2.9.  UA negative leuk/nit, +small blood.  CT abd/pelvis w/ contrast was performed; per official radiology report: "...IMPRESSION:  2 mm distal right ureteral stone with mild obstruction.".  Pt was given IV hydration and analgesia, BP improved.  Repeat VBG performed showed pH 7.30, lactate 1.2.  Pt was sent to CDU for continued MET care plan:  IV hydration, monitor vitals, Flomax, strain urine, supportive care / pain control, AM labs, general observation care / monitoring.

## 2024-11-10 NOTE — ED CDU PROVIDER INITIAL DAY NOTE - OBJECTIVE STATEMENT
29M, no PMHx, presents to ED complaining of acute onset flank and abdominal pain. Pt primarily Danish speaking, declines interpretation services, and elects to have friend at bedside interpret instead. Patient states that pain began approxi-1 hour prior to presentation, described as sharp, rating from flank down into groin.  Patient states he has never had similar pain, denies hematuria, urinary frequency/urgency.  Denies concern for STI.  Denies fever, cough, chills, chest pain, N/V/D/C, bowel symptoms.  Denies testicular pain, pain radiation to testicle, or any other genital pain.  Denies trauma.    CDU NICOLE Guthrie Note----  ED Provider HPI from [facility], as above, noted.  Pt is a 30 yo male, no stated PMH, presented to the ED c/o acute onset right flank pain, radiating to groin.  In the ED, initial /122, VSS otherwise, pt afebrile in ED.  WBC 8.60; CMP: bicarb 19, anion gap 16, BUN 24, cr 0.98.  Lipase 46.  A1c 4.6.  VBG: pH 7.42, lactate 2.9.  UA negative leuk/nit, +small blood.  CT abd/pelvis w/ contrast was performed; per official radiology report: "...IMPRESSION:  2 mm distal right ureteral stone with mild obstruction.".  Pt was given IV hydration and analgesia, BP improved.  Repeat VBG performed showed pH 7.30, lactate 1.2.  Pt was sent to CDU for continued MET care plan:  IV hydration, monitor vitals, Flomax, strain urine, supportive care / pain control, AM labs, general observation care / monitoring.

## 2024-11-10 NOTE — ED CDU PROVIDER DISPOSITION NOTE - CLINICAL COURSE
30 yo male, no stated PMH, presented to the ED c/o acute onset right flank pain, radiating to groin.  In the ED, initial /122, VSS otherwise, pt afebrile in ED.  WBC 8.60; CMP: bicarb 19, anion gap 16, BUN 24, cr 0.98.  Lipase 46.  A1c 4.6.  VBG: pH 7.42, lactate 2.9.  UA negative leuk/nit, +small blood.  CT abd/pelvis w/ contrast was performed; per official radiology report: "...IMPRESSION:  2 mm distal right ureteral stone with mild obstruction.".  Pt was given IV hydration and analgesia, BP improved.  Repeat VBG performed showed pH 7.30, lactate 1.2.  Pt was sent to CDU for continued MET care plan:  IV hydration, monitor vitals, Flomax, strain urine, supportive care / pain control, AM labs, general observation care / monitoring.  Upon re-evaluation this morning, pt endorsing urinary urgency but can not urinate. Bladder scan performed, approx 200cc urine in bladder. Pt was instructed to ambulate and encouraged movement from bed, continue IVH, toradol and reassess in 1 hour. Upon reassessment pt was able to urinate 300cc, toradol relieved pain and pt is currently asymptomatic. rpt labs are nonactionable this morning. Discussed dc plan/instructions with pt using  # [id number]. Pt amenable to plan. Return precautions given.

## 2024-11-10 NOTE — ED PROVIDER NOTE - CLINICAL SUMMARY MEDICAL DECISION MAKING FREE TEXT BOX
29M, no PMHx, presents to ED complaining of acute onset flank and abdominal pain.  Patient states that pain began approxi-1 hour prior to presentation, described as sharp, rating from flank down into groin.  Patient states he has never had similar pain, denies hematuria, urinary frequency/urgency.  Denies concern for STI.  Denies fever, cough, chills, chest pain, N/V/D/C, bowel symptoms.  Denies testicular pain, pain radiation to testicle, or any other genital pain.  Denies trauma.    Patient noted to be hypertensive, tachycardic.  Afebrile.  Presentation highly suggestive of renal lithiasis, however, given right lower quadrant tenderness, differential diagnosis also includes appendicitis.  Concern for septic stone, UTI.  Will acquire labs, UA/UCX, CT abdomen pelvis with IV contrast.  Dispo per workup and reassessment. 29M, no PMHx, presents to ED complaining of acute onset flank and abdominal pain. Pt primarily Nepali speaking, declines interpretation services, and elects to have friend at bedside interpret instead. Patient states that pain began approxi-1 hour prior to presentation, described as sharp, rating from flank down into groin.  Patient states he has never had similar pain, denies hematuria, urinary frequency/urgency.  Denies concern for STI.  Denies fever, cough, chills, chest pain, N/V/D/C, bowel symptoms.  Denies testicular pain, pain radiation to testicle, or any other genital pain.  Denies trauma.    Patient noted to be hypertensive, tachycardic.  Afebrile.  Presentation highly suggestive of renal lithiasis, however, given right lower quadrant tenderness, differential diagnosis also includes appendicitis.  Concern for septic stone, UTI.  Will acquire labs, UA/UCX, CT abdomen pelvis with IV contrast.  Dispo per workup and reassessment.

## 2024-11-10 NOTE — ED CDU PROVIDER INITIAL DAY NOTE - DETAILS
IV hydration, monitor vitals, Flomax, strain urine, supportive care / pain control, AM labs, general observation care / monitoring.

## 2024-11-10 NOTE — ED ADULT NURSE REASSESSMENT NOTE - NS ED NURSE REASSESS COMMENT FT1
Pt A&Ox4, resting comfortably awake in stretcher. Pt states his pain is improved, not endorsing any current pain. Pt denies c/p, SOB, headache, blurry vision, abd pain, n/v/d, or fever like symptoms. Respirations even and unlabored. NAD noted. Comfort measures provided, safety maintained.
Pt desatting to 67% on room air. Pt placed on 2L NC, 02 saturation of 100%. MD Walton made aware. NSR on cardiac monitor. Plan of care ongoing.
Pt received from break RN. Pt resting comfortably in stretcher. Pt states improvement in pain, states 4/10. Respirations even and unlabored, 100% 02 on room air. NSR cardiac monitor. Comfort measures provided, safety maintained. Plan of care ongoing.
pt extremely uncomfortable, 18G IV placed left AC in anticipation of pain meds
Break RN note- Patient resting quietly in bed, breathing even and nonlabored on 2L nasal cannula. No acute distress. Awaiting CT results. Patient's pain improved. Safety maintained. Patient stable upon exiting the room.
Pt resting at bedside A&Ox4,  used for communication ([id number] [place]), pt endorses pain in lower back, stated he vomited the during the night, pt denies any hematemesis or hematuria. Labs drawn at bedside. Pain has been relieved since medication administration.

## 2024-11-10 NOTE — ED PROVIDER NOTE - PHYSICAL EXAMINATION
Gen: AAOx3, uncomfortable, rolling on stretcher  HEENT: NCAT. PEERLA, EOMI, oral mucosa moist, normal conjunctiva  Lung: CTAB, no respiratory distress, no wheezes/rhonchi/rales B/L, speaking in full sentences  CV: RRR, no murmurs, rubs or gallops  Abd: soft, TTP RUQ, RLQ, R CVA  MSK: no visible deformities  Neuro: No focal sensory or motor deficits  Skin: Warm, well perfused, no rash  Psych: normal affect.

## 2024-11-10 NOTE — ED CDU PROVIDER INITIAL DAY NOTE - PHYSICAL EXAMINATION
CONSTITUTIONAL:  Well appearing, awake, alert, oriented to person, place, time/situation and in no apparent distress.  Pt. is objectively comfortable appearing and verbalizing in full, clear, effortless sentences.  ENMT: NC/AT.  Airway patent.  Nasal mucosa clear.  Moist mucous membranes.  Neck supple.  EYES:  Clear OU.  CARDIAC:  Normal rate, regular rhythm.  Heart sounds S1 S2.  No murmurs, gallops, or rubs.  RESPIRATORY:  Breath sounds clear and equal bilaterally.  No wheezes, no rales, no rhonchi.  GASTROINTESTINAL:  Abdomen soft, non-distended, right flank mild TTP.  No rebound, no guarding.  NEUROLOGICAL:  Alert and oriented to person/place/time/situation.  No focal deficits; no tremors noted.   MUSCULOSKELETAL:  Range of motion is not limited.   SKIN:  Skin color unremarkable.  Skin warm, dry.  PSYCHIATRIC:  Alert and oriented to person/place/time/situation.  Mood and affect WNL.  No apparent risk to self or others.

## 2024-11-10 NOTE — ED CDU PROVIDER INITIAL DAY NOTE - ATTENDING APP SHARED VISIT CONTRIBUTION OF CARE
Dr. Mayo:  I performed a face to face bedside interview with patient regarding history of present illness, review of symptoms and past medical history. I completed an independent physical exam.  I have discussed patient's plan of care with PA.   I agree with note as stated above, having amended the EMR as needed to reflect my findings.   This includes HISTORY OF PRESENT ILLNESS, HIV, PAST MEDICAL/SURGICAL/FAMILY/SOCIAL HISTORY, ALLERGIES AND HOME MEDICATIONS, REVIEW OF SYSTEMS, PHYSICAL EXAM, and any PROGRESS NOTES during the time I functioned as the attending physician for this patient.    29M denies pmh presented with acute onset flank pain, found to have kidney stone on CT.  No acute complaints this morning.    Exam:  - pt was comfortably sleeping  - rrr   -ctab  - abd soft ntnd    A/P  - kidney stone, pain controlled  - pending repeat labs this morning and likely discharge with outpatient follow up

## 2024-11-10 NOTE — ED PROVIDER NOTE - PROGRESS NOTE DETAILS
Sneddon, DO:  Patient remains uncomfortable, requiring increasing pain management.    Provider notified that patient had intermittent desaturation of 67%, patient now on 2 L nasal cannula, no further desaturation events.  Patient remains on telemetry.    CT reveals 2 mm distal right ureteral stone with mild obstruction.  Pending labs and UA. DO Isabelle:  Labs and UA unremarkable.  Case presented to CDU PA, patient admitted to observation for further pain management.

## 2024-11-10 NOTE — ED CDU PROVIDER DISPOSITION NOTE - ATTENDING CONTRIBUTION TO CARE
Dr. Mayo:  I performed a face to face bedside interview with patient regarding history of present illness, review of symptoms and past medical history. I completed an independent physical exam.  I have discussed patient's plan of care with PA.   I agree with note as stated above, having amended the EMR as needed to reflect my findings.   This includes HISTORY OF PRESENT ILLNESS, HIV, PAST MEDICAL/SURGICAL/FAMILY/SOCIAL HISTORY, ALLERGIES AND HOME MEDICATIONS, REVIEW OF SYSTEMS, PHYSICAL EXAM, and any PROGRESS NOTES during the time I functioned as the attending physician for this patient.

## (undated) DEVICE — SUT VICRYL 0 27" UR-6

## (undated) DEVICE — VENODYNE/SCD SLEEVE CALF MEDIUM

## (undated) DEVICE — BLADE SURGICAL #15 CARBON

## (undated) DEVICE — XI CORD MONOPOLAR CAUTERY (GREEN)

## (undated) DEVICE — WARMING BLANKET UPPER ADULT

## (undated) DEVICE — SUT MONOCRYL 4-0 27" PS-2 UNDYED

## (undated) DEVICE — D HELP - CLEARVIEW CLEARIFY SYSTEM

## (undated) DEVICE — XI ARM PERMANENT CAUTERY HOOK

## (undated) DEVICE — SOL IRR POUR H2O 500ML

## (undated) DEVICE — SYR LUER LOK 20CC

## (undated) DEVICE — DISSECTOR ENDOSCOPIC KITTNER SINGLE TIP

## (undated) DEVICE — XI ARM FORCEP FENESTRATED BIPOLAR 8MM

## (undated) DEVICE — BASIN SET SINGLE

## (undated) DEVICE — DRSG DERMABOND 0.7ML

## (undated) DEVICE — ELCTR BOVIE PENCIL SMOKE EVACUATION

## (undated) DEVICE — DRSG STERISTRIPS 0.5 X 4"

## (undated) DEVICE — TROCAR COVIDIEN VERSAONE FIXATION CANNULA 5MM

## (undated) DEVICE — XI ENDOWRIST SUCTION IRRIGATOR 8MM

## (undated) DEVICE — PROTECTOR HEEL / ELBOW

## (undated) DEVICE — DRAPE 3/4 SHEET 52X76"

## (undated) DEVICE — POSITIONER STRAP ARMBOARD VELCRO TS-30

## (undated) DEVICE — XI ARM CLIP APPLIER LARGE

## (undated) DEVICE — XI ARM FORCEP PROGRASP 8MM

## (undated) DEVICE — SOL IRR POUR NS 0.9% 1000ML

## (undated) DEVICE — DRAPE TOWEL BLUE 17" X 24"

## (undated) DEVICE — GLV 7 PROTEXIS (WHITE)

## (undated) DEVICE — TIP METZENBAUM SCISSOR MONOPOLAR ENDOCUT (ORANGE)

## (undated) DEVICE — ELCTR BOVIE TIP BLADE INSULATED 2.75" EDGE

## (undated) DEVICE — XI OBTURATOR OPTICAL BLADELESS 8MM

## (undated) DEVICE — XI CORD BIPOLAR CAUTERY (BLUE)

## (undated) DEVICE — GLV 7.5 PROTEXIS (BLUE)

## (undated) DEVICE — ELCTR GROUNDING PAD ADULT COVIDIEN

## (undated) DEVICE — PACK GENERAL LAPAROSCOPY

## (undated) DEVICE — TROCAR COVIDIEN BLUNT TIP HASSAN 10MM STANDARD

## (undated) DEVICE — TROCAR COVIDIEN VERSAPORT BLADELESS OPTICAL 5MM STANDARD

## (undated) DEVICE — TUBING OLYMPUS INSUFFLATION